# Patient Record
Sex: MALE | URBAN - METROPOLITAN AREA
[De-identification: names, ages, dates, MRNs, and addresses within clinical notes are randomized per-mention and may not be internally consistent; named-entity substitution may affect disease eponyms.]

---

## 2023-11-22 ENCOUNTER — TELEMEDICINE (OUTPATIENT)
Age: 78
End: 2023-11-22
Payer: MEDICARE

## 2023-11-22 DIAGNOSIS — T42.8X5A MOTOR FLUCTUATIONS RELATED TO MEDICATION USE IN PARKINSON'S DISEASE: ICD-10-CM

## 2023-11-22 DIAGNOSIS — R63.4 WEIGHT LOSS: ICD-10-CM

## 2023-11-22 DIAGNOSIS — G20 MOTOR FLUCTUATIONS RELATED TO MEDICATION USE IN PARKINSON'S DISEASE: ICD-10-CM

## 2023-11-22 DIAGNOSIS — G20.B2 PARKINSON'S DISEASE WITH DYSKINESIA AND FLUCTUATING MANIFESTATIONS: Primary | ICD-10-CM

## 2023-11-22 DIAGNOSIS — G31.84 MCI (MILD COGNITIVE IMPAIRMENT): ICD-10-CM

## 2023-11-22 DIAGNOSIS — R13.14 PHARYNGOESOPHAGEAL DYSPHAGIA: ICD-10-CM

## 2023-11-22 PROCEDURE — 1123F ACP DISCUSS/DSCN MKR DOCD: CPT | Performed by: PSYCHIATRY & NEUROLOGY

## 2023-11-22 PROCEDURE — 99214 OFFICE O/P EST MOD 30 MIN: CPT | Performed by: PSYCHIATRY & NEUROLOGY

## 2023-11-22 RX ORDER — APIXABAN 5 MG/1
5 TABLET, FILM COATED ORAL 2 TIMES DAILY
COMMUNITY
Start: 2023-11-02

## 2023-11-22 RX ORDER — AMANTADINE HYDROCHLORIDE 100 MG/1
CAPSULE, GELATIN COATED ORAL DAILY
COMMUNITY
Start: 2023-09-09 | End: 2023-11-22 | Stop reason: SDUPTHER

## 2023-11-22 RX ORDER — CARBIDOPA/LEVODOPA 25MG-250MG
1 TABLET ORAL 3 TIMES DAILY
COMMUNITY
Start: 2023-11-15 | End: 2023-11-22 | Stop reason: SDUPTHER

## 2023-11-22 RX ORDER — AMANTADINE HYDROCHLORIDE 100 MG/1
100 CAPSULE, GELATIN COATED ORAL DAILY
Qty: 30 CAPSULE | Refills: 5 | Status: SHIPPED | OUTPATIENT
Start: 2023-11-22

## 2023-11-22 RX ORDER — SOTALOL HYDROCHLORIDE 80 MG/1
80 TABLET ORAL 2 TIMES DAILY
COMMUNITY
Start: 2023-11-18

## 2023-11-22 RX ORDER — PANTOPRAZOLE SODIUM 20 MG/1
20 TABLET, DELAYED RELEASE ORAL DAILY
COMMUNITY
Start: 2023-11-12

## 2023-11-22 RX ORDER — ATORVASTATIN CALCIUM 10 MG/1
10 TABLET, FILM COATED ORAL DAILY
COMMUNITY
Start: 2023-10-21

## 2023-11-22 RX ORDER — CARBIDOPA AND LEVODOPA 50; 200 MG/1; MG/1
1 TABLET, EXTENDED RELEASE ORAL NIGHTLY
Qty: 30 TABLET | Refills: 5 | Status: SHIPPED | OUTPATIENT
Start: 2023-11-22

## 2023-11-22 RX ORDER — CARBIDOPA/LEVODOPA 25MG-250MG
1.5 TABLET ORAL 3 TIMES DAILY
Qty: 105 TABLET | Refills: 5 | Status: SHIPPED | OUTPATIENT
Start: 2023-11-22

## 2023-11-22 RX ORDER — MIRTAZAPINE 15 MG/1
15 TABLET, FILM COATED ORAL NIGHTLY
COMMUNITY
Start: 2023-09-16 | End: 2023-11-22 | Stop reason: SDUPTHER

## 2023-11-22 RX ORDER — MIRTAZAPINE 15 MG/1
15 TABLET, FILM COATED ORAL NIGHTLY
Qty: 30 TABLET | Refills: 5 | Status: SHIPPED | OUTPATIENT
Start: 2023-11-22

## 2023-11-22 RX ORDER — TAMSULOSIN HYDROCHLORIDE 0.4 MG/1
CAPSULE ORAL
COMMUNITY
Start: 2023-10-02

## 2023-11-22 NOTE — PROGRESS NOTES
Anselmo Heller MD       Sarah Leon is a 78 y.o. male presenting as a follow patient for a   Chief Complaint   Patient presents with    parkinsons      Date of Diagnosis: 2015  Since diagnosis in [place]. Onset of symtoms : since 2015    Progression:   2 falls in between. No LOC. On standing does gets lightheaded. Has more dyskinesias- in head/face/hands   On amantadine - resolved dyskinesias       on sinemet 25/250 mg tid- 8am, 12 noon and 4 pm  Takes sinemet cr 25/100 mg at 8pm     Having no weight loss  Has been choking on solids  No trouble with liquids  Taking longer to finish meals       Primary motor symptoms: Resting tremor, Bradykinesia (slow movement), Rigidity and Postural Instability (Impaired Balance and Coordination)  Secondary motor symptoms: Stooped posture, a tendency to lean forward, Fatigue, Impaired fine motor dexterity and motor coordination , Impaired gross motor coordination , Speech problems, such as softness of voice or slurred speech caused by lack of muscle control and Loss of facial expression, or \"masking\"   Non motor symptoms: Dementia or confusion and Depression      Now:   Dyskinesias: in head. Resolved     Motor fluctuations: wears off an hour before is due for medication  Tremors worsen  But not severe     Tremors: yes, right handed  Has resting tremors in right arm. None in left arm/legs. Worsens without sinemet       Stiffness: no longer feels tight on waking up   Gait/walking difficulties: better  Cane, walker, wheelchair use: no  Falls: 2 in last 4-5 months   Mood/Behavior changes: yes, had a manic phase in summer 2021  Hallucinations: no  Sleep: no sleep concerns and feels rested on waking up. Denies dreams. But rare dreams are vivid   Slowness in daily activities: yes   Slurred Speech:slurring more   Drooling of saliva: yes, during day time  Swallowing difficulty: periodic choking while swallowing   Pain: rarely back pain     Memory loss:   Independent

## 2023-12-04 ENCOUNTER — TELEPHONE (OUTPATIENT)
Age: 78
End: 2023-12-04

## 2023-12-04 NOTE — TELEPHONE ENCOUNTER
Pt. Called in stating Pt. Was having episode of dyskinesia, last visit Sinemet was changed to 1 1/2 tabs, pt wife and realized she was giving amantadine in AM instead of PM so she will change dosage of Sinemet back to 1 tab to see if there is a difference.

## 2024-02-20 ENCOUNTER — TELEPHONE (OUTPATIENT)
Age: 79
End: 2024-02-20

## 2024-02-20 NOTE — TELEPHONE ENCOUNTER
----- Message from Teodora Rolle MA sent at 2/20/2024  1:55 PM EST -----  Pt called stating his cardiologist Dr Ferris wanted a brain scan in regards to pt high blood pressure, cardiologist thinks it is more related to neurological problems than cardiology issues. Pt also said that the doctor stated he was going to call you last week to discuss it.

## 2024-02-20 NOTE — TELEPHONE ENCOUNTER
He had a mri brain in 12/2022    He has a pacemaker, if we have to do a repeat scan like mri, does patient know if pacemaker is mri compatible?      I did not get a call yet from cardiologist.    If possible, if they can call me or place a message on epic as a telephone encounter- I can see if there is any specific concern      Philly Covington MD

## 2024-02-23 NOTE — TELEPHONE ENCOUNTER
Find out from Dr. Mobley- what he wanted to discuss with me about  Please pass on my cell number if needed      Philly Covington MD

## 2024-03-18 ENCOUNTER — OFFICE VISIT (OUTPATIENT)
Age: 79
End: 2024-03-18
Payer: MEDICARE

## 2024-03-18 VITALS — SYSTOLIC BLOOD PRESSURE: 122 MMHG | WEIGHT: 181.5 LBS | DIASTOLIC BLOOD PRESSURE: 69 MMHG | HEART RATE: 91 BPM

## 2024-03-18 DIAGNOSIS — G20.B2 PARKINSON'S DISEASE WITH DYSKINESIA AND FLUCTUATING MANIFESTATIONS: Primary | ICD-10-CM

## 2024-03-18 DIAGNOSIS — G20.A2 MOTOR FLUCTUATIONS RELATED TO MEDICATION USE IN PARKINSON'S DISEASE: ICD-10-CM

## 2024-03-18 DIAGNOSIS — T42.8X5A MOTOR FLUCTUATIONS RELATED TO MEDICATION USE IN PARKINSON'S DISEASE: ICD-10-CM

## 2024-03-18 DIAGNOSIS — I95.1 ORTHOSTATIC HYPOTENSION: ICD-10-CM

## 2024-03-18 DIAGNOSIS — G31.84 MCI (MILD COGNITIVE IMPAIRMENT): ICD-10-CM

## 2024-03-18 DIAGNOSIS — R63.4 WEIGHT LOSS: ICD-10-CM

## 2024-03-18 PROCEDURE — 1123F ACP DISCUSS/DSCN MKR DOCD: CPT | Performed by: PSYCHIATRY & NEUROLOGY

## 2024-03-18 PROCEDURE — 99214 OFFICE O/P EST MOD 30 MIN: CPT | Performed by: PSYCHIATRY & NEUROLOGY

## 2024-03-18 RX ORDER — MULTIVIT-MIN/FERROUS GLUCONATE 9 MG/15 ML
15 LIQUID (ML) ORAL DAILY
COMMUNITY

## 2024-03-18 RX ORDER — DIPHENOXYLATE HYDROCHLORIDE AND ATROPINE SULFATE 2.5; .025 MG/1; MG/1
TABLET ORAL
COMMUNITY
Start: 2024-01-11

## 2024-03-18 RX ORDER — MIRTAZAPINE 15 MG/1
15 TABLET, FILM COATED ORAL NIGHTLY
Qty: 30 TABLET | Refills: 5 | Status: SHIPPED | OUTPATIENT
Start: 2024-03-18

## 2024-03-18 RX ORDER — VITAMIN B COMPLEX
1 CAPSULE ORAL DAILY
COMMUNITY

## 2024-03-18 RX ORDER — ASPIRIN 81 MG/1
81 TABLET, COATED ORAL DAILY
COMMUNITY

## 2024-03-18 RX ORDER — AMANTADINE HYDROCHLORIDE 100 MG/1
100 CAPSULE, GELATIN COATED ORAL DAILY
Qty: 30 CAPSULE | Refills: 5 | Status: SHIPPED | OUTPATIENT
Start: 2024-03-18

## 2024-03-18 RX ORDER — RIVASTIGMINE TARTRATE 3 MG/1
3 CAPSULE ORAL 2 TIMES DAILY
Qty: 60 CAPSULE | Refills: 3 | Status: SHIPPED | OUTPATIENT
Start: 2024-03-18

## 2024-03-18 RX ORDER — FLUOCINOLONE ACETONIDE 0.11 MG/ML
OIL TOPICAL
COMMUNITY
Start: 2024-01-16

## 2024-03-18 ASSESSMENT — MINI MENTAL STATE EXAM
WHAT COUNTRY ARE WE IN?: 1
SAY: I AM GOING TO NAME THREE OBJECTS. WHEN I AM FINISHED, I WANT YOU TO REPEAT
THEM. REMEMBER WHAT THEY ARE BECAUSE I AM GOING TO ASK YOU TO NAME THEM AGAIN IN
A FEW MINUTES.  SAY THE FOLLOWING WORDS SLOWLY AT 1-SECOND INTERVALS - BALL/ CAR/ MAN [ITERATIONS FOR REPEAT ADMINISTRATION]: 3
SAY: I WOULD LIKE YOU TO COUNT BACKWARD FROM 100 BY SEVENS: 0
WHAT STATE [OR PROVINCE] ARE WE IN?: 1
WHAT CITY/TOWN ARE WE IN?: 1
SUM ALL MMSE QUESTIONS FOR TOTAL SCORE [OUT OF 30].: 20
NOW WHAT WERE THE THREE OBJECTS I ASKED YOU TO REMEMBER?: 1
WHAT MONTH IS THIS?: 1
WHAT YEAR IS THIS?: 1
SAY: FOLD THE PAPER IN HALF ONCE WITH BOTH HANDS, SCORE IF PAPER IS CORRECTLY FOLDED IN HALF.: 1
WHAT FLOOR ARE WE ON [IN FACILITY]?/ WHAT ROOM ARE WE IN [IN HOME]?: 1
WHAT IS TODAY'S DATE?: 0
ASK THE PERSON IF HE IS RIGHT OR LEFT-HANDED. TAKE A PIECE OF PAPER AND HOLD IT UP IN
FRONT OF THE PERSON. SAY: TAKE THIS PAPER IN YOUR RIGHT/LEFT HAND (WHICHEVER IS NON-
DOMINANT), SCORE IF PAPER IS PICKED UP IN CORRECT HAND.: 1
PLACE DESIGN, ERASER AND PENCIL IN FRONT OF THE PERSON.  SAY:  COPY THIS DESIGN PLEASE.  SHOW: DESIGN. ALLOW: MULTIPLE TRIES. WAIT UNTIL PERSON IS FINISHED AND HANDS IT BACK. SCORE: ONLY FOR DIAGRAM WITH 4-SIDED FIGURE BETWEEN TWO 5-SIDED FIGURES: 0
WHICH SEASON IS THIS?: 1
SAY: READ THE WORDS ON THE PAGE AND THEN DO WHAT IT SAYS. THEN HAND THE PERSON
THE SHEET WITH CLOSE YOUR EYES ON IT. IF THE SUBJECT READS AND DOES NOT CLOSE THEIR EYES, REPEAT UP TO THREE TIMES. SCORE ONLY IF SUBJECT CLOSES EYES.: 1
SAY: I WOULD LIKE YOU TO REPEAT THIS PHRASE AFTER ME: NO IFS, ANDS, OR BUTS.: 1
SHOW: WRISTWATCH [OBJECT] ASK: WHAT IS THIS CALLED?: 1
SHOW: PENCIL [OBJECT] ASK: WHAT IS THIS CALLED?: 1
WHAT IS THE NAME OF THIS BUILDING [IN FACILITY]?/WHAT IS THE STREET ADDRESS OF THIS HOUSE [IN HOME]?: 0
WHAT DAY OF THE WEEK IS THIS?: 1
SAY: PUT THE PAPER DOWN ON THE FLOOR, SCORE IF PAPER IS PLACED BACK ON FLOOR: 1
HAND THE PERSON A PENCIL AND PAPER. SAY: WRITE ANY COMPLETE SENTENCE ON THAT
PIECE OF PAPER. (NOTE: THE SENTENCE MUST MAKE SENSE. IGNORE SPELLING ERRORS): 1

## 2024-03-18 NOTE — PROGRESS NOTES
Philly Covington MD       Holland Leong is a 78 y.o. male presenting as a follow patient for a   Chief Complaint   Patient presents with    Follow-up     6 month follow up for parkinsons        Having ER visits with atrial fibrillation  Seeing Dr. Carmona.      Date of Diagnosis: 2015  Since diagnosis in [place].     Onset of symtoms : since 2015    Progression:   No falls   Having orthostatic hypotension, so wearing compression stockings  On amantadine - resolved dyskinesias . Minimal in evenings      on sinemet 25/250 mg  1 pill tid- 8am, 12 noon and 4 pm  Takes sinemet cr 25/100 mg at 8pm      Primary motor symptoms: Resting tremor, Bradykinesia (slow movement), Rigidity and Postural Instability (Impaired Balance and Coordination)  Secondary motor symptoms: Stooped posture, a tendency to lean forward, Fatigue, Impaired fine motor dexterity and motor coordination , Impaired gross motor coordination , Speech problems, such as softness of voice or slurred speech caused by lack of muscle control and Loss of facial expression, or \"masking\"   Non motor symptoms: Dementia or confusion and Depression      Now:   Dyskinesias: in head. Resolved   Better on amantadine     Motor fluctuations: wears off an hour before is due for medication  Tremors worsen      Tremors: yes, right handed  Has resting tremors in right arm.   None in left arm/legs.   Able to bowl 3 games on friday      Stiffness: no longer feels tight on waking up   Gait/walking difficulties: better  Cane, walker, wheelchair use: no  Falls: none  Mood/Behavior changes: yes, had a manic phase in summer 2021  Hallucinations: medication induced, from reglan  Sleep: vivid dreams.   Denies dreams.  But rare dreams are vivid   Slowness in daily activities: yes   Slurred Speech:slurring more   Drooling of saliva: yes, during day time  Swallowing difficulty: some penetration with thin liquids. Corrected with chin tucks. No thickeners.   Pain: rarely back pain

## 2024-03-22 ENCOUNTER — TELEPHONE (OUTPATIENT)
Age: 79
End: 2024-03-22

## 2024-03-22 RX ORDER — RIVASTIGMINE 4.6 MG/24H
1 PATCH, EXTENDED RELEASE TRANSDERMAL DAILY
Qty: 30 PATCH | Refills: 3 | Status: SHIPPED | OUTPATIENT
Start: 2024-03-22

## 2024-03-22 NOTE — TELEPHONE ENCOUNTER
Try exelon patch      The following medication has been approved and sent to your pharmacy    Requested Prescriptions     Signed Prescriptions Disp Refills    rivastigmine (EXELON) 4.6 MG/24HR 30 patch 3     Sig: Place 1 patch onto the skin daily     Authorizing Provider: LUCA ZALDIVAR

## 2024-03-22 NOTE — TELEPHONE ENCOUNTER
----- Message from Teodora Rolle MA sent at 3/21/2024  4:43 PM EDT -----  Regarding: exelon  Pt had reaction to Exelon, vomiting, wife wondered if there was another med he could try

## 2024-05-28 ENCOUNTER — TELEPHONE (OUTPATIENT)
Age: 79
End: 2024-05-28

## 2024-05-28 NOTE — TELEPHONE ENCOUNTER
Had a fall last week. On getting out of car. On trying to reach for door - fell .  Blames it on depth perception is affected   Starting vision issues- depth perception is affected    Tremors is worse- between doses of medications    Cognition- at times worse    Bp sitting - 120's-     Having diarrhea.     -  compression stockings, increase fluid intake     Off eliquis. Has pacemaker and watchman device         Keep July appt   Philly Covington MD

## 2024-05-28 NOTE — TELEPHONE ENCOUNTER
Pt wife called in during the weekend and states pt has been having changes in vision and is blurry and had a fall

## 2024-07-11 PROCEDURE — 88312 SPECIAL STAINS GROUP 1: CPT | Performed by: DERMATOLOGY

## 2024-07-11 PROCEDURE — 88305 TISSUE EXAM BY PATHOLOGIST: CPT | Performed by: DERMATOLOGY

## 2024-07-15 ENCOUNTER — LAB REQUISITION (OUTPATIENT)
Dept: DERMATOPATHOLOGY | Facility: CLINIC | Age: 79
End: 2024-07-15
Payer: COMMERCIAL

## 2024-07-15 DIAGNOSIS — R21 RASH AND OTHER NONSPECIFIC SKIN ERUPTION: ICD-10-CM

## 2024-07-16 LAB
LABORATORY COMMENT REPORT: NORMAL
PATH REPORT.FINAL DX SPEC: NORMAL
PATH REPORT.GROSS SPEC: NORMAL
PATH REPORT.RELEVANT HX SPEC: NORMAL
PATH REPORT.TOTAL CANCER: NORMAL

## 2024-07-18 ENCOUNTER — OFFICE VISIT (OUTPATIENT)
Age: 79
End: 2024-07-18
Payer: MEDICARE

## 2024-07-18 VITALS
HEART RATE: 90 BPM | DIASTOLIC BLOOD PRESSURE: 76 MMHG | WEIGHT: 173.8 LBS | TEMPERATURE: 97.3 F | SYSTOLIC BLOOD PRESSURE: 152 MMHG

## 2024-07-18 DIAGNOSIS — I95.1 ORTHOSTATIC HYPOTENSION: ICD-10-CM

## 2024-07-18 DIAGNOSIS — G20.A2 MOTOR FLUCTUATIONS RELATED TO MEDICATION USE IN PARKINSON'S DISEASE (HCC): ICD-10-CM

## 2024-07-18 DIAGNOSIS — G20.B2 PARKINSON'S DISEASE WITH DYSKINESIA AND FLUCTUATING MANIFESTATIONS (HCC): Primary | ICD-10-CM

## 2024-07-18 DIAGNOSIS — T42.8X5A MOTOR FLUCTUATIONS RELATED TO MEDICATION USE IN PARKINSON'S DISEASE (HCC): ICD-10-CM

## 2024-07-18 DIAGNOSIS — G31.84 MCI (MILD COGNITIVE IMPAIRMENT): ICD-10-CM

## 2024-07-18 PROCEDURE — 99214 OFFICE O/P EST MOD 30 MIN: CPT | Performed by: PSYCHIATRY & NEUROLOGY

## 2024-07-18 PROCEDURE — 1123F ACP DISCUSS/DSCN MKR DOCD: CPT | Performed by: PSYCHIATRY & NEUROLOGY

## 2024-07-18 RX ORDER — CARBIDOPA AND LEVODOPA 50; 200 MG/1; MG/1
1 TABLET, EXTENDED RELEASE ORAL NIGHTLY
Qty: 30 TABLET | Refills: 5 | Status: SHIPPED | OUTPATIENT
Start: 2024-07-18

## 2024-07-18 RX ORDER — AMANTADINE HYDROCHLORIDE 100 MG/1
100 CAPSULE, GELATIN COATED ORAL DAILY
Qty: 30 CAPSULE | Refills: 5 | Status: SHIPPED | OUTPATIENT
Start: 2024-07-18

## 2024-07-18 RX ORDER — ENTACAPONE 200 MG/1
200 TABLET ORAL 3 TIMES DAILY
Qty: 90 TABLET | Refills: 3 | Status: SHIPPED | OUTPATIENT
Start: 2024-07-18

## 2024-07-18 RX ORDER — RIVASTIGMINE TARTRATE 3 MG/1
3 CAPSULE ORAL 2 TIMES DAILY
Qty: 60 CAPSULE | Refills: 3 | Status: CANCELLED | OUTPATIENT
Start: 2024-07-18

## 2024-07-18 RX ORDER — MIRTAZAPINE 15 MG/1
15 TABLET, FILM COATED ORAL NIGHTLY
Qty: 30 TABLET | Refills: 5 | Status: SHIPPED | OUTPATIENT
Start: 2024-07-18

## 2024-07-18 RX ORDER — CARBIDOPA/LEVODOPA 25MG-250MG
1 TABLET ORAL 3 TIMES DAILY
Qty: 90 TABLET | Refills: 3 | Status: SHIPPED | OUTPATIENT
Start: 2024-07-18

## 2024-07-18 RX ORDER — RIVASTIGMINE 9.5 MG/24H
1 PATCH, EXTENDED RELEASE TRANSDERMAL DAILY
Qty: 30 PATCH | Refills: 5 | Status: SHIPPED | OUTPATIENT
Start: 2024-07-18

## 2024-07-18 RX ORDER — RIVASTIGMINE 4.6 MG/24H
1 PATCH, EXTENDED RELEASE TRANSDERMAL DAILY
Qty: 30 PATCH | Refills: 3 | Status: CANCELLED | OUTPATIENT
Start: 2024-07-18

## 2024-07-18 NOTE — PROGRESS NOTES
3/24   In 6/23: 25    ASSESSMENT AND PLAN   1. Parkinson's disease with dyskinesia and fluctuating manifestations (HCC)  Continue sinemet 25/250 mg tid  Add comtan 200 mg tid  Continue sinemet cr 25/100 mg qhs  Using amantadine 100 mg daily for dyskinesias    2. Motor fluctuations related to medication use in Parkinson's disease (HCC)  Has wearing off effects on sinemet 30 minutes before next dose  So add comtan tid    3. MCI (mild cognitive impairment)  Mci slightly better  On exelon patch increase to 9.5 mg daily  Could not tolerate exelon pills  On remeron 15 mg qhs    4. Orthostatic hypotension  Advised compression stockings.   With any PD med change         Philly Covington MD

## 2024-08-12 RX ORDER — CARBIDOPA/LEVODOPA 25MG-250MG
1 TABLET ORAL 3 TIMES DAILY
Qty: 90 TABLET | Refills: 3 | Status: SHIPPED | OUTPATIENT
Start: 2024-08-12

## 2024-09-09 ENCOUNTER — TELEPHONE (OUTPATIENT)
Age: 79
End: 2024-09-09

## 2024-09-09 RX ORDER — FLUDROCORTISONE ACETATE 0.1 MG/1
0.1 TABLET ORAL DAILY
Qty: 30 TABLET | Refills: 1 | Status: SHIPPED | OUTPATIENT
Start: 2024-09-09 | End: 2024-11-08

## 2024-09-19 ENCOUNTER — OFFICE VISIT (OUTPATIENT)
Age: 79
End: 2024-09-19
Payer: MEDICARE

## 2024-09-19 VITALS
HEIGHT: 73 IN | SYSTOLIC BLOOD PRESSURE: 132 MMHG | BODY MASS INDEX: 22.4 KG/M2 | WEIGHT: 169 LBS | HEART RATE: 89 BPM | DIASTOLIC BLOOD PRESSURE: 74 MMHG

## 2024-09-19 DIAGNOSIS — G20.B2 PARKINSON'S DISEASE WITH DYSKINESIA AND FLUCTUATING MANIFESTATIONS (HCC): Primary | ICD-10-CM

## 2024-09-19 DIAGNOSIS — G20.A2 MOTOR FLUCTUATIONS RELATED TO MEDICATION USE IN PARKINSON'S DISEASE (HCC): ICD-10-CM

## 2024-09-19 DIAGNOSIS — G31.84 MCI (MILD COGNITIVE IMPAIRMENT): ICD-10-CM

## 2024-09-19 DIAGNOSIS — T42.8X5A MOTOR FLUCTUATIONS RELATED TO MEDICATION USE IN PARKINSON'S DISEASE (HCC): ICD-10-CM

## 2024-09-19 DIAGNOSIS — I95.1 ORTHOSTATIC HYPOTENSION: ICD-10-CM

## 2024-09-19 DIAGNOSIS — R11.0 NAUSEA: ICD-10-CM

## 2024-09-19 PROCEDURE — 1123F ACP DISCUSS/DSCN MKR DOCD: CPT | Performed by: PSYCHIATRY & NEUROLOGY

## 2024-09-19 PROCEDURE — 99214 OFFICE O/P EST MOD 30 MIN: CPT | Performed by: PSYCHIATRY & NEUROLOGY

## 2024-09-19 RX ORDER — CARBIDOPA/LEVODOPA 25MG-250MG
TABLET ORAL
Qty: 135 TABLET | Refills: 5 | Status: SHIPPED | OUTPATIENT
Start: 2024-09-19

## 2024-09-19 RX ORDER — AMANTADINE HYDROCHLORIDE 100 MG/1
100 CAPSULE, GELATIN COATED ORAL DAILY
Qty: 30 CAPSULE | Refills: 5 | Status: SHIPPED | OUTPATIENT
Start: 2024-09-19

## 2024-09-19 RX ORDER — POLYETHYLENE GLYCOL 3350 17 G/17G
17 POWDER, FOR SOLUTION ORAL DAILY PRN
COMMUNITY

## 2024-09-19 RX ORDER — ONDANSETRON 4 MG/1
4 TABLET, FILM COATED ORAL 2 TIMES DAILY
Qty: 60 TABLET | Refills: 5 | Status: SHIPPED | OUTPATIENT
Start: 2024-09-19

## 2024-09-19 RX ORDER — FLUDROCORTISONE ACETATE 0.1 MG/1
0.2 TABLET ORAL DAILY
Qty: 60 TABLET | Refills: 5 | Status: SHIPPED | OUTPATIENT
Start: 2024-09-19 | End: 2025-03-18

## 2024-09-19 RX ORDER — DOCUSATE SODIUM 100 MG/1
100 CAPSULE, LIQUID FILLED ORAL 2 TIMES DAILY
COMMUNITY

## 2024-09-19 RX ORDER — CARBIDOPA AND LEVODOPA 50; 200 MG/1; MG/1
1 TABLET, EXTENDED RELEASE ORAL NIGHTLY
Qty: 30 TABLET | Refills: 5 | Status: SHIPPED | OUTPATIENT
Start: 2024-09-19

## 2024-09-19 RX ORDER — MIRTAZAPINE 15 MG/1
15 TABLET, FILM COATED ORAL NIGHTLY
Qty: 30 TABLET | Refills: 5 | Status: SHIPPED | OUTPATIENT
Start: 2024-09-19

## 2024-11-18 ENCOUNTER — OFFICE VISIT (OUTPATIENT)
Age: 79
End: 2024-11-18
Payer: MEDICARE

## 2024-11-18 VITALS
WEIGHT: 168.4 LBS | SYSTOLIC BLOOD PRESSURE: 120 MMHG | DIASTOLIC BLOOD PRESSURE: 78 MMHG | HEIGHT: 73 IN | BODY MASS INDEX: 22.32 KG/M2

## 2024-11-18 DIAGNOSIS — I95.1 ORTHOSTATIC HYPOTENSION: ICD-10-CM

## 2024-11-18 DIAGNOSIS — G20.A2 MOTOR FLUCTUATIONS RELATED TO MEDICATION USE IN PARKINSON'S DISEASE (HCC): ICD-10-CM

## 2024-11-18 DIAGNOSIS — T42.8X5A MOTOR FLUCTUATIONS RELATED TO MEDICATION USE IN PARKINSON'S DISEASE (HCC): ICD-10-CM

## 2024-11-18 DIAGNOSIS — R11.0 NAUSEA: ICD-10-CM

## 2024-11-18 DIAGNOSIS — G20.B2 PARKINSON'S DISEASE WITH DYSKINESIA AND FLUCTUATING MANIFESTATIONS (HCC): Primary | ICD-10-CM

## 2024-11-18 DIAGNOSIS — G31.84 MCI (MILD COGNITIVE IMPAIRMENT): ICD-10-CM

## 2024-11-18 PROCEDURE — 1159F MED LIST DOCD IN RCRD: CPT | Performed by: PSYCHIATRY & NEUROLOGY

## 2024-11-18 PROCEDURE — 1123F ACP DISCUSS/DSCN MKR DOCD: CPT | Performed by: PSYCHIATRY & NEUROLOGY

## 2024-11-18 PROCEDURE — 99214 OFFICE O/P EST MOD 30 MIN: CPT | Performed by: PSYCHIATRY & NEUROLOGY

## 2024-11-18 RX ORDER — LEVODOPA AND CARBIDOPA 145; 36.25 MG/1; MG/1
3 CAPSULE, EXTENDED RELEASE ORAL 3 TIMES DAILY
Qty: 270 CAPSULE | Refills: 5 | Status: SHIPPED | OUTPATIENT
Start: 2024-11-18

## 2024-11-18 RX ORDER — MEMANTINE HYDROCHLORIDE 5 MG/1
5 TABLET ORAL 2 TIMES DAILY
Qty: 60 TABLET | Refills: 5 | Status: SHIPPED | OUTPATIENT
Start: 2024-11-18

## 2024-11-18 NOTE — PROGRESS NOTES
Philly Covington MD       Holland Leong is a 79 y.o. male presenting as a follow patient for a   Chief Complaint   Patient presents with    Follow-up    PARKINSONS      Having ER visits with atrial fibrillation  Seeing Dr. Carmona.   had watchman placed on jan 2024  Off eliquis             Parkinsons disease   Date of Diagnosis: 2015  Since diagnosis in [place].     Onset of symtoms : since 2015    Progression: since dose increase:   Orthostatic hypotension better with d/c of metoprolol  -wearing compression stockings  -on florinef   -not taking droxidopa       2. Nausea gone   D/bill of exelon     3. Increased dyskinesias                Present medication:   on sinemet 25/250 mg  1.5 pill tid- 8am, 12 noon and 4 pm. Wears off 30 minutes before next dose   Not taking comtan - made him extremely weak     Takes sinemet cr 50/200 mg at 8pm    On amantadine - dyskinesias still prominent   Primary motor symptoms: Resting tremor, Bradykinesia (slow movement), Rigidity and Postural Instability (Impaired Balance and Coordination)  Secondary motor symptoms: Stooped posture, a tendency to lean forward, Fatigue, Impaired fine motor dexterity and motor coordination , Impaired gross motor coordination , Speech problems, such as softness of voice or slurred speech caused by lack of muscle control and Loss of facial expression, or \"masking\"   Non motor symptoms: Dementia or confusion and Depression      Now:   Dyskinesias: starting again  Some tongue movements     Motor fluctuations: wears off an hour before is due for medication  Tremors worsen      Tremors: yes, right handed  Has resting tremors in right arm.   None in left arm/legs.   Able to bowl 3 games on friday      Stiffness: no longer feels tight on waking up   Gait/walking difficulties: better  Cane, walker, wheelchair use: no  Falls: 2 falls   Mood/Behavior changes: yes, had a manic phase in summer 2021  Hallucinations: medication induced, from reglan  None

## 2024-11-18 NOTE — PATIENT INSTRUCTIONS
D/c sinemet 25/250 mg tablets, 1.5 tablets three times a day    Hold sinemet cr 50/200 mg at night      Hold amantadine      Only use rytary 3 pillls three times a day      If needed, add amantadine      Stay off exelon  Add namen  da 5 mg twice a day    Use stockings, florinef 0.1 mg daily  Hold off droxydopa.   Philly Covington MD

## 2024-12-01 ENCOUNTER — APPOINTMENT (OUTPATIENT)
Dept: RADIOLOGY | Facility: HOSPITAL | Age: 79
End: 2024-12-01
Payer: MEDICARE

## 2024-12-01 ENCOUNTER — HOSPITAL ENCOUNTER (EMERGENCY)
Facility: HOSPITAL | Age: 79
Discharge: HOME | End: 2024-12-01
Attending: EMERGENCY MEDICINE
Payer: MEDICARE

## 2024-12-01 ENCOUNTER — APPOINTMENT (OUTPATIENT)
Dept: CARDIOLOGY | Facility: HOSPITAL | Age: 79
End: 2024-12-01
Payer: MEDICARE

## 2024-12-01 VITALS
WEIGHT: 169.75 LBS | OXYGEN SATURATION: 97 % | RESPIRATION RATE: 20 BRPM | BODY MASS INDEX: 21.79 KG/M2 | DIASTOLIC BLOOD PRESSURE: 78 MMHG | TEMPERATURE: 97.7 F | HEART RATE: 70 BPM | SYSTOLIC BLOOD PRESSURE: 152 MMHG | HEIGHT: 74 IN

## 2024-12-01 DIAGNOSIS — R42 DIZZINESS: ICD-10-CM

## 2024-12-01 DIAGNOSIS — I95.1 ORTHOSTATIC HYPOTENSION: Primary | ICD-10-CM

## 2024-12-01 LAB
ALBUMIN SERPL BCP-MCNC: 4.2 G/DL (ref 3.4–5)
ALP SERPL-CCNC: 71 U/L (ref 33–136)
ALT SERPL W P-5'-P-CCNC: 5 U/L (ref 10–52)
ANION GAP SERPL CALC-SCNC: 11 MMOL/L (ref 10–20)
AST SERPL W P-5'-P-CCNC: 22 U/L (ref 9–39)
BASOPHILS # BLD AUTO: 0.04 X10*3/UL (ref 0–0.1)
BASOPHILS NFR BLD AUTO: 0.6 %
BILIRUB SERPL-MCNC: 0.7 MG/DL (ref 0–1.2)
BNP SERPL-MCNC: 69 PG/ML (ref 0–99)
BUN SERPL-MCNC: 17 MG/DL (ref 6–23)
CALCIUM SERPL-MCNC: 9.3 MG/DL (ref 8.6–10.3)
CARDIAC TROPONIN I PNL SERPL HS: 7 NG/L (ref 0–20)
CHLORIDE SERPL-SCNC: 103 MMOL/L (ref 98–107)
CO2 SERPL-SCNC: 29 MMOL/L (ref 21–32)
CREAT SERPL-MCNC: 0.92 MG/DL (ref 0.5–1.3)
EGFRCR SERPLBLD CKD-EPI 2021: 84 ML/MIN/1.73M*2
EOSINOPHIL # BLD AUTO: 0.09 X10*3/UL (ref 0–0.4)
EOSINOPHIL NFR BLD AUTO: 1.2 %
ERYTHROCYTE [DISTWIDTH] IN BLOOD BY AUTOMATED COUNT: 13.3 % (ref 11.5–14.5)
FLUAV RNA RESP QL NAA+PROBE: NOT DETECTED
FLUBV RNA RESP QL NAA+PROBE: NOT DETECTED
GLUCOSE SERPL-MCNC: 95 MG/DL (ref 74–99)
HCT VFR BLD AUTO: 40.8 % (ref 41–52)
HGB BLD-MCNC: 13.6 G/DL (ref 13.5–17.5)
IMM GRANULOCYTES # BLD AUTO: 0.02 X10*3/UL (ref 0–0.5)
IMM GRANULOCYTES NFR BLD AUTO: 0.3 % (ref 0–0.9)
LYMPHOCYTES # BLD AUTO: 1.31 X10*3/UL (ref 0.8–3)
LYMPHOCYTES NFR BLD AUTO: 18.1 %
MCH RBC QN AUTO: 32.8 PG (ref 26–34)
MCHC RBC AUTO-ENTMCNC: 33.3 G/DL (ref 32–36)
MCV RBC AUTO: 98 FL (ref 80–100)
MONOCYTES # BLD AUTO: 0.45 X10*3/UL (ref 0.05–0.8)
MONOCYTES NFR BLD AUTO: 6.2 %
NEUTROPHILS # BLD AUTO: 5.32 X10*3/UL (ref 1.6–5.5)
NEUTROPHILS NFR BLD AUTO: 73.6 %
NRBC BLD-RTO: 0 /100 WBCS (ref 0–0)
PLATELET # BLD AUTO: 304 X10*3/UL (ref 150–450)
POTASSIUM SERPL-SCNC: 3.8 MMOL/L (ref 3.5–5.3)
PROT SERPL-MCNC: 6.8 G/DL (ref 6.4–8.2)
RBC # BLD AUTO: 4.15 X10*6/UL (ref 4.5–5.9)
SARS-COV-2 RNA RESP QL NAA+PROBE: NOT DETECTED
SODIUM SERPL-SCNC: 139 MMOL/L (ref 136–145)
WBC # BLD AUTO: 7.2 X10*3/UL (ref 4.4–11.3)

## 2024-12-01 PROCEDURE — 71045 X-RAY EXAM CHEST 1 VIEW: CPT

## 2024-12-01 PROCEDURE — 84484 ASSAY OF TROPONIN QUANT: CPT

## 2024-12-01 PROCEDURE — 93005 ELECTROCARDIOGRAM TRACING: CPT

## 2024-12-01 PROCEDURE — 70450 CT HEAD/BRAIN W/O DYE: CPT

## 2024-12-01 PROCEDURE — 85025 COMPLETE CBC W/AUTO DIFF WBC: CPT

## 2024-12-01 PROCEDURE — 84075 ASSAY ALKALINE PHOSPHATASE: CPT

## 2024-12-01 PROCEDURE — 36415 COLL VENOUS BLD VENIPUNCTURE: CPT

## 2024-12-01 PROCEDURE — 71045 X-RAY EXAM CHEST 1 VIEW: CPT | Performed by: RADIOLOGY

## 2024-12-01 PROCEDURE — 70450 CT HEAD/BRAIN W/O DYE: CPT | Performed by: STUDENT IN AN ORGANIZED HEALTH CARE EDUCATION/TRAINING PROGRAM

## 2024-12-01 PROCEDURE — 99285 EMERGENCY DEPT VISIT HI MDM: CPT | Mod: 25

## 2024-12-01 PROCEDURE — 87636 SARSCOV2 & INF A&B AMP PRB: CPT

## 2024-12-01 PROCEDURE — 83880 ASSAY OF NATRIURETIC PEPTIDE: CPT

## 2024-12-01 ASSESSMENT — COLUMBIA-SUICIDE SEVERITY RATING SCALE - C-SSRS
1. IN THE PAST MONTH, HAVE YOU WISHED YOU WERE DEAD OR WISHED YOU COULD GO TO SLEEP AND NOT WAKE UP?: NO
2. HAVE YOU ACTUALLY HAD ANY THOUGHTS OF KILLING YOURSELF?: NO
6. HAVE YOU EVER DONE ANYTHING, STARTED TO DO ANYTHING, OR PREPARED TO DO ANYTHING TO END YOUR LIFE?: NO

## 2024-12-01 ASSESSMENT — LIFESTYLE VARIABLES
HAVE PEOPLE ANNOYED YOU BY CRITICIZING YOUR DRINKING: NO
HAVE YOU EVER FELT YOU SHOULD CUT DOWN ON YOUR DRINKING: NO
TOTAL SCORE: 0
EVER HAD A DRINK FIRST THING IN THE MORNING TO STEADY YOUR NERVES TO GET RID OF A HANGOVER: NO
EVER FELT BAD OR GUILTY ABOUT YOUR DRINKING: NO

## 2024-12-01 ASSESSMENT — PAIN - FUNCTIONAL ASSESSMENT: PAIN_FUNCTIONAL_ASSESSMENT: 0-10

## 2024-12-01 ASSESSMENT — PAIN SCALES - GENERAL: PAINLEVEL_OUTOF10: 0 - NO PAIN

## 2024-12-01 NOTE — PROGRESS NOTES
The patient was seen by the midlevel/resident.  I have personally saw the patient and made/approved the management plan and take responsibility for the patient management.  I reviewed the EKG's (when done) and agree with the interpretation.  I have seen and examined the patient; agree with the workup, evaluation, MDM, and diagnosis.  The care plan has been discussed with the midlevel/resident; I have reviewed the note and agree with the documented findings.     Patient presents after a fall at home.  He says he did not hit his head.  He did complain of feeling little lightheaded.  He has a history of advanced Parkinson's and has had episodes like this in the past.  He denies any pain.  He denies any fever chills cough or cold.  We worked up in ED with CT scan and labs.  Patient stable. Patient is stable. No indication for hospitalization. Patient wants to go home.   ED Course as of 12/01/24 1521   Sun Dec 01, 2024   1315 EKG performed at 1307, interpreted by me.  Atrial paced rhythm with rate of 73.  Right bundle branch block.  No ST elevation or depression, no T wave abnormalities.  When compared to EKG from January 2022, EKG today is unchanged [PW]   1325 Spoke with Payam from [employer] who confirmed patient's report after interrogating his pacemaker.  Reports it has been in for 9 years, no abnormal activity, no recent defibrillation, patient's been an appropriate rhythm and confirms that there is about 5 months left of battery on the patient's pace pacemaker which wife confirms at bedside [PW]      ED Course User Index  [PW] Rossy Goodwin DO         Diagnoses as of 12/01/24 1521   Dizziness   Orthostatic hypotension     Ravindra Portillo MD

## 2024-12-01 NOTE — ED PROVIDER NOTES
Emergency Department Provider Note        History of Present Illness     History provided by: Patient and EMS  Limitations to History: Mental Illness and Dementia  External Records Reviewed with Brief Summary: I reviewed prior ED visits, Care Everywhere, discharge summaries and outpatient records as appropriate.     HPI:  Prem Michael is a 79 y.o. male past medical history significant for Parkinson's disease, pacemaker, neurogenic orthostatic hypotension presenting to the emergency department with dizziness.  Wife provides majority of collateral given patient's Parkinson disorder.  Wife states that patient woke up this morning and reported feeling dizzy when attempting to get out of bed.  Wife states she came into the room to check on him, noted patient to b be standing and reporting lightheadedness.  Reported he felt like he was going to fall so she slowly lowered him to the floor.  After a period of time she was able to get him up into a chair and then patient was able to ambulate back to bed.  Patient does not currently require a walker or a cane.  Patient was able to use the bathroom without significant difficulty and wife states that she rechecked his blood pressure after getting him back into bed his blood pressure was noted to be 80/50.  She denies any fever, chills, nausea, vomiting, diarrhea, abdominal pain.  Reports no head strike or loss of consciousness.  No headache or vision changes, no loss of vision.  No chest pain or shortness of breath.  Patient is followed closely outpatient by neurology, cardiology and receives physical therapy weekly.  No other acute complaints.    Physical Exam   Triage vitals:  T 36.6 °C (97.9 °F)  HR 72  BP (!) 154/113  RR 18  O2 100 % None (Room air)    General: Awake, alert, in no acute distress  Eyes: Gaze conjugate.  No scleral icterus or injection  HENT: Normo-cephalic, atraumatic. No stridor  CV: Regular rate, regular rhythm. Radial pulses 2+ bilaterally  Resp:  Breathing non-labored, speaking in full sentences.  Clear to auscultation bilaterally  GI: Soft, non-distended, non-tender. No rebound or guarding.  MSK/Extremities: No gross bony deformities. Moving all extremities.  No bilateral lower pitting edema.  Pelvis is stable to palpation, no reproducible tenderness to bilateral upper and lower extremities.  Skin: Warm. Appropriate color.  No evidence of skin tears, hematomas, ecchymosis or cellulitic changes  Neuro: Alert. Oriented. Face symmetric. Speech is fluent.  Gross strength and sensation intact in b/l UE and LEs  Psych: Appropriate mood and affect    Medical Decision Making & ED Course   Medical Decision Makin y.o. male presenting to the emergency department with dizziness and concerning for presyncope.  On physical exam, patient is hemodynamically stable and in no acute distress, overall well-appearing.  Given history, exam, workup, suspect his symptoms are likely in the setting of known orthostatic hypotension.  Low suspicion for heart failure, low concern for ICH as he has not had any trauma or headache, no history of seizures.  Head CT ordered and negative for evidence of acute stroke or intracranial bleed.  Interrogated patient's pacemaker and it is functioning appropriately.  Has equal pulses in the upper and lower extremities, low concern for aortic causes of dizziness or presyncope.  EKG shows paced rhythm which is at baseline compared to prior.  In the absence of any chest pain and shortness of breath and low concern for PE, he has no evidence of DVT or infection on exam.  Workup here grossly unremarkable with no acute findings.  Patient tested for COVID and influenza, both are negative.,  Troponin BMP within normal limits.  On reevaluation, patient and family updated with results.  With shared decision making with family, patient and family are comfortable with plan for outpatient follow-up.  Do not feel that further workup indicated at this time.  Discussed results, diagnosis/differential, and plan with patient. Patient advised to follow up with primary physician in 2-3 days. Discussed return precautions and encouraged patient to return to the Emergency Department for any concerning symptoms or worsening condition. Patient expresses understanding and is in agreement. All questions answered. Patient discharged in stable condition.  ----     Social Determinants of Health which Significantly Impact Care: None identified     EKG Independent Interpretation: EKG interpreted by myself. Please see ED Course for full interpretation.    Independent Result Review and Interpretation: Relevant laboratory and radiographic results were reviewed and independently interpreted by myself.  As necessary, they are commented on in the ED Course.    Chronic conditions affecting the patient's care: As documented above in [facility]    The patient was discussed with the following consultants/services: None    Care Considerations: As documented above in [facility]    ED Course:  ED Course as of 12/01/24 1510   Sun Dec 01, 2024   1315 EKG performed at 1307, interpreted by me.  Atrial paced rhythm with rate of 73.  Right bundle branch block.  No ST elevation or depression, no T wave abnormalities.  When compared to EKG from January 2022, EKG today is unchanged [PW]   1325 Spoke with Payam from [employer] who confirmed patient's report after interrogating his pacemaker.  Reports it has been in for 9 years, no abnormal activity, no recent defibrillation, patient's been an appropriate rhythm and confirms that there is about 5 months left of battery on the patient's pace pacemaker which wife confirms at bedside [PW]      ED Course User Index  [PW] Rossy Goodwin DO         Diagnoses as of 12/01/24 1510   Dizziness   Orthostatic hypotension     Disposition   As a result of the work-up, the patient was discharged home.  he was informed of his diagnosis and instructed to come back with any concerns or  worsening of condition.  he and was agreeable to the plan as discussed above.  he was given the opportunity to ask questions.  All of the patient's questions were answered.    Procedures   Procedures    Patient seen and discussed with ED attending physician.    Rossy Goodwin DO  Emergency Medicine       Rossy Goodwin DO  Resident  12/01/24 3205

## 2024-12-01 NOTE — ED TRIAGE NOTES
Pt arrived by EMS from home, family called and said that he had low blood pressure (90/52). EMS got a blood pressure of 122/81. Pt states he got dizzy and fell. Pt states no chest pain, OB or pain upon arrival. Pt does state feeling light headed.

## 2024-12-02 LAB
ATRIAL RATE: 73 BPM
P AXIS: 53 DEGREES
P OFFSET: 203 MS
P ONSET: 142 MS
PR INTERVAL: 196 MS
Q ONSET: 220 MS
QRS COUNT: 12 BEATS
QRS DURATION: 148 MS
QT INTERVAL: 456 MS
QTC CALCULATION(BAZETT): 502 MS
QTC FREDERICIA: 487 MS
R AXIS: -54 DEGREES
T AXIS: 11 DEGREES
T OFFSET: 448 MS
VENTRICULAR RATE: 73 BPM

## 2024-12-16 ENCOUNTER — TELEPHONE (OUTPATIENT)
Age: 79
End: 2024-12-16

## 2024-12-16 NOTE — TELEPHONE ENCOUNTER
Pt's wife called in stating pt's dyskinesia has progressed since stopping amantadine and asking if pt should start taking again.    Last Appt: 11/18/24  Next Appt: 03/05/25

## 2025-03-05 ENCOUNTER — TELEPHONE (OUTPATIENT)
Age: 80
End: 2025-03-05

## 2025-03-05 ENCOUNTER — TELEMEDICINE (OUTPATIENT)
Age: 80
End: 2025-03-05
Payer: MEDICARE

## 2025-03-05 DIAGNOSIS — G20.A2 MOTOR FLUCTUATIONS RELATED TO MEDICATION USE IN PARKINSON'S DISEASE (HCC): ICD-10-CM

## 2025-03-05 DIAGNOSIS — I95.1 ORTHOSTATIC HYPOTENSION: ICD-10-CM

## 2025-03-05 DIAGNOSIS — R47.01 APHASIA: ICD-10-CM

## 2025-03-05 DIAGNOSIS — T42.8X5A MOTOR FLUCTUATIONS RELATED TO MEDICATION USE IN PARKINSON'S DISEASE (HCC): ICD-10-CM

## 2025-03-05 DIAGNOSIS — G20.B2 PARKINSON'S DISEASE WITH DYSKINESIA AND FLUCTUATING MANIFESTATIONS (HCC): Primary | ICD-10-CM

## 2025-03-05 PROCEDURE — 99214 OFFICE O/P EST MOD 30 MIN: CPT | Performed by: PSYCHIATRY & NEUROLOGY

## 2025-03-05 PROCEDURE — 1123F ACP DISCUSS/DSCN MKR DOCD: CPT | Performed by: PSYCHIATRY & NEUROLOGY

## 2025-03-05 PROCEDURE — 1159F MED LIST DOCD IN RCRD: CPT | Performed by: PSYCHIATRY & NEUROLOGY

## 2025-03-05 RX ORDER — FLUDROCORTISONE ACETATE 0.1 MG/1
0.2 TABLET ORAL DAILY
Qty: 60 TABLET | Refills: 5 | Status: SHIPPED | OUTPATIENT
Start: 2025-03-05 | End: 2025-09-01

## 2025-03-05 RX ORDER — AMANTADINE HYDROCHLORIDE 100 MG/1
100 CAPSULE, GELATIN COATED ORAL DAILY
Qty: 30 CAPSULE | Refills: 5 | Status: SHIPPED | OUTPATIENT
Start: 2025-03-05

## 2025-03-05 RX ORDER — LEVODOPA AND CARBIDOPA 145; 36.25 MG/1; MG/1
3 CAPSULE, EXTENDED RELEASE ORAL 3 TIMES DAILY
Qty: 270 CAPSULE | Refills: 5 | Status: SHIPPED | OUTPATIENT
Start: 2025-03-05

## 2025-03-05 RX ORDER — MEMANTINE HYDROCHLORIDE 10 MG/1
10 TABLET ORAL 2 TIMES DAILY
Qty: 60 TABLET | Refills: 5 | Status: SHIPPED | OUTPATIENT
Start: 2025-03-05

## 2025-03-05 NOTE — PROGRESS NOTES
Philly Covington MD       Holland Leong is a 79 y.o. male presenting as a follow patient for a parkinsons disease    3 day off period.   Fell on Monday  Slowed responses.   Needed a walker   Was dizzy   164/82 mm bp  109/42 mm. -when he fell         Parkinsons disease   Date of Diagnosis: 2015  Since diagnosis in [place].     Onset of symtoms : since 2015    Progression: since dose increase:   Orthostatic hypotension better with d/c of metoprolol  -wearing compression stockings  -on florinef   -not taking droxidopa        2. Vomiting -resolved   D/bill of exelon patch      3. Increased dyskinesias                Present medication:   Treatment : was on sinemet 25/250 mg  1.5 pill tid- 8am, 12 noon and 4 pm. Wears off 30 minutes before next dose   Not taking comtan - made him extremely weak     D/bill Takes sinemet cr 50/200 mg at 8pm   On amantadine   Rytary 36/145 mg 3 tablets tid     Primary motor symptoms: Resting tremor, Bradykinesia (slow movement), Rigidity and Postural Instability (Impaired Balance and Coordination)  Secondary motor symptoms: Stooped posture, a tendency to lean forward, Fatigue, Impaired fine motor dexterity and motor coordination , Impaired gross motor coordination , Speech problems, such as softness of voice or slurred speech caused by lack of muscle control and Loss of facial expression, or \"masking\"   Non motor symptoms: Dementia or confusion and Depression      Now:   Dyskinesias: minimal   Some tongue movements     Motor fluctuations: wears off an hour before is due for medication  Notices it in pm - evening     Tremors: yes, right handed  Has resting tremors in right arm.   None in left arm/legs.   Able to bowl 3 games on friday      Stiffness: no longer feels tight on waking up   Gait/walking difficulties: better  Cane, walker, wheelchair use: no  Falls: 1 fall on monday  Mood/Behavior changes: yes, had a manic phase in summer 2021  Hallucinations: medication induced, from

## 2025-03-11 RX ORDER — MIRTAZAPINE 15 MG/1
15 TABLET, FILM COATED ORAL NIGHTLY
Qty: 30 TABLET | Refills: 5 | Status: SHIPPED | OUTPATIENT
Start: 2025-03-11

## 2025-04-04 ENCOUNTER — TELEPHONE (OUTPATIENT)
Age: 80
End: 2025-04-04

## 2025-04-04 RX ORDER — QUETIAPINE FUMARATE 25 MG/1
25 TABLET, FILM COATED ORAL
Qty: 30 TABLET | Refills: 3 | Status: SHIPPED | OUTPATIENT
Start: 2025-04-04

## 2025-04-04 NOTE — TELEPHONE ENCOUNTER
Pt. Wife called in today stating pt. Has been having very vivid dreams that when he wakes up he can't tell what's real or not.  Pt. Wife states he was on seroquel in the past and worked for him she is requesting pt. Be back on it

## 2025-04-04 NOTE — TELEPHONE ENCOUNTER
The following medication has been approved and sent to your pharmacy    Requested Prescriptions     Signed Prescriptions Disp Refills    QUEtiapine (SEROQUEL) 25 MG tablet 30 tablet 3     Sig: Take 1 tablet by mouth nightly     Authorizing Provider: LUCA ZALDIVAR

## 2025-04-07 ENCOUNTER — HOSPITAL ENCOUNTER (OUTPATIENT)
Dept: MRI IMAGING | Age: 80
Discharge: HOME | End: 2025-04-09

## 2025-04-07 ENCOUNTER — TELEPHONE (OUTPATIENT)
Age: 80
End: 2025-04-07

## 2025-04-07 DIAGNOSIS — R47.01 APHASIA: ICD-10-CM

## 2025-05-06 RX ORDER — MEMANTINE HYDROCHLORIDE 10 MG/1
10 TABLET ORAL 2 TIMES DAILY
Qty: 60 TABLET | Refills: 5 | OUTPATIENT
Start: 2025-05-06

## 2025-07-16 ENCOUNTER — HOSPITAL ENCOUNTER (OUTPATIENT)
Dept: MRI IMAGING | Age: 80
Discharge: HOME | End: 2025-07-18
Attending: PSYCHIATRY & NEUROLOGY
Payer: MEDICARE

## 2025-07-16 PROCEDURE — 70551 MRI BRAIN STEM W/O DYE: CPT

## 2025-07-21 ENCOUNTER — OFFICE VISIT (OUTPATIENT)
Age: 80
End: 2025-07-21
Payer: MEDICARE

## 2025-07-21 VITALS
BODY MASS INDEX: 21.83 KG/M2 | HEART RATE: 85 BPM | WEIGHT: 164.7 LBS | DIASTOLIC BLOOD PRESSURE: 58 MMHG | SYSTOLIC BLOOD PRESSURE: 97 MMHG | HEIGHT: 73 IN

## 2025-07-21 DIAGNOSIS — G20.B2 PARKINSON'S DISEASE WITH DYSKINESIA AND FLUCTUATING MANIFESTATIONS (HCC): Primary | ICD-10-CM

## 2025-07-21 DIAGNOSIS — T42.8X5A MOTOR FLUCTUATIONS RELATED TO MEDICATION USE IN PARKINSON'S DISEASE (HCC): ICD-10-CM

## 2025-07-21 DIAGNOSIS — G20.A2 MOTOR FLUCTUATIONS RELATED TO MEDICATION USE IN PARKINSON'S DISEASE (HCC): ICD-10-CM

## 2025-07-21 DIAGNOSIS — G20.A1 MILD DEMENTIA DUE TO PARKINSON'S DISEASE, WITH AGITATION (HCC): ICD-10-CM

## 2025-07-21 DIAGNOSIS — F02.A11 MILD DEMENTIA DUE TO PARKINSON'S DISEASE, WITH AGITATION (HCC): ICD-10-CM

## 2025-07-21 DIAGNOSIS — I95.1 ORTHOSTATIC HYPOTENSION: ICD-10-CM

## 2025-07-21 PROCEDURE — 1159F MED LIST DOCD IN RCRD: CPT | Performed by: PSYCHIATRY & NEUROLOGY

## 2025-07-21 PROCEDURE — 1123F ACP DISCUSS/DSCN MKR DOCD: CPT | Performed by: PSYCHIATRY & NEUROLOGY

## 2025-07-21 PROCEDURE — 99214 OFFICE O/P EST MOD 30 MIN: CPT | Performed by: PSYCHIATRY & NEUROLOGY

## 2025-07-21 RX ORDER — POLYETHYLENE GLYCOL 3350 17 G/17G
POWDER, FOR SOLUTION ORAL EVERY MORNING
COMMUNITY

## 2025-07-21 RX ORDER — LEVODOPA AND CARBIDOPA 145; 36.25 MG/1; MG/1
3 CAPSULE, EXTENDED RELEASE ORAL 3 TIMES DAILY
Qty: 270 CAPSULE | Refills: 5 | Status: SHIPPED | OUTPATIENT
Start: 2025-07-21

## 2025-07-21 RX ORDER — QUETIAPINE FUMARATE 25 MG/1
25 TABLET, FILM COATED ORAL
Qty: 30 TABLET | Refills: 5 | Status: SHIPPED | OUTPATIENT
Start: 2025-07-21

## 2025-07-21 RX ORDER — MIRTAZAPINE 15 MG/1
15 TABLET, ORALLY DISINTEGRATING ORAL NIGHTLY
Qty: 30 TABLET | Refills: 5 | Status: SHIPPED | OUTPATIENT
Start: 2025-07-21

## 2025-07-21 RX ORDER — FLUDROCORTISONE ACETATE 0.1 MG/1
0.2 TABLET ORAL DAILY
Qty: 60 TABLET | Refills: 5 | Status: SHIPPED | OUTPATIENT
Start: 2025-07-21 | End: 2026-01-17

## 2025-07-21 RX ORDER — AMANTADINE HYDROCHLORIDE 50 MG/5ML
100 SOLUTION ORAL DAILY
Qty: 300 ML | Refills: 5 | Status: SHIPPED | OUTPATIENT
Start: 2025-07-21

## 2025-07-21 RX ORDER — MEMANTINE HYDROCHLORIDE 2 MG/ML
10 SOLUTION ORAL 2 TIMES DAILY
Qty: 600 ML | Refills: 5 | Status: SHIPPED | OUTPATIENT
Start: 2025-07-21

## 2025-07-21 ASSESSMENT — MINI MENTAL STATE EXAM
WHAT YEAR IS THIS?: 1
WHAT IS TODAY'S DATE?: 0
SAY: PUT THE PAPER DOWN ON THE FLOOR, SCORE IF PAPER IS PLACED BACK ON FLOOR: 0
SAY: I WOULD LIKE YOU TO REPEAT THIS PHRASE AFTER ME: NO IFS, ANDS, OR BUTS.: 1
WHAT FLOOR ARE WE ON [IN FACILITY]?/ WHAT ROOM ARE WE IN [IN HOME]?: 1
SHOW: WRISTWATCH [OBJECT] ASK: WHAT IS THIS CALLED?: 0
SAY: FOLD THE PAPER IN HALF ONCE WITH BOTH HANDS, SCORE IF PAPER IS CORRECTLY FOLDED IN HALF.: 0
WHICH SEASON IS THIS?: 1
WHAT MONTH IS THIS?: 1
WHAT CITY/TOWN ARE WE IN?: 1
PLACE DESIGN, ERASER AND PENCIL IN FRONT OF THE PERSON.  SAY:  COPY THIS DESIGN PLEASE.  SHOW: DESIGN. ALLOW: MULTIPLE TRIES. WAIT UNTIL PERSON IS FINISHED AND HANDS IT BACK. SCORE: ONLY FOR DIAGRAM WITH 4-SIDED FIGURE BETWEEN TWO 5-SIDED FIGURES: 0
NOW WHAT WERE THE THREE OBJECTS I ASKED YOU TO REMEMBER?: 2
WHAT IS THE NAME OF THIS BUILDING [IN FACILITY]?/WHAT IS THE STREET ADDRESS OF THIS HOUSE [IN HOME]?: 0
WHAT DAY OF THE WEEK IS THIS?: 0
HAND THE PERSON A PENCIL AND PAPER. SAY: WRITE ANY COMPLETE SENTENCE ON THAT
PIECE OF PAPER. (NOTE: THE SENTENCE MUST MAKE SENSE. IGNORE SPELLING ERRORS): 0
SUM ALL MMSE QUESTIONS FOR TOTAL SCORE [OUT OF 30].: 15
SAY: READ THE WORDS ON THE PAGE AND THEN DO WHAT IT SAYS. THEN HAND THE PERSON
THE SHEET WITH CLOSE YOUR EYES ON IT. IF THE SUBJECT READS AND DOES NOT CLOSE THEIR EYES, REPEAT UP TO THREE TIMES. SCORE ONLY IF SUBJECT CLOSES EYES.: 1
WHAT COUNTRY ARE WE IN?: 1
SHOW: PENCIL [OBJECT] ASK: WHAT IS THIS CALLED?: 1
SAY: I AM GOING TO NAME THREE OBJECTS. WHEN I AM FINISHED, I WANT YOU TO REPEAT
THEM. REMEMBER WHAT THEY ARE BECAUSE I AM GOING TO ASK YOU TO NAME THEM AGAIN IN
A FEW MINUTES.  SAY THE FOLLOWING WORDS SLOWLY AT 1-SECOND INTERVALS - BALL/ CAR/ MAN [ITERATIONS FOR REPEAT ADMINISTRATION]: 2
ASK THE PERSON IF HE IS RIGHT OR LEFT-HANDED. TAKE A PIECE OF PAPER AND HOLD IT UP IN
FRONT OF THE PERSON. SAY: TAKE THIS PAPER IN YOUR RIGHT/LEFT HAND (WHICHEVER IS NON-
DOMINANT), SCORE IF PAPER IS PICKED UP IN CORRECT HAND.: 0
WHAT STATE [OR PROVINCE] ARE WE IN?: 1
SAY: I WOULD LIKE YOU TO COUNT BACKWARD FROM 100 BY SEVENS: 1

## 2025-07-21 NOTE — PROGRESS NOTES
100 mg q daily   Remeron 15 mg qhs    namenda 5 mg bid   Seroquel 25 mg qhs           Past Medical History:   Diagnosis Date    Cancer (HCC) 2014    skin    Hearing loss     Hypertension     Memory disorder     PD progression    Movement disorder 2015    PD    Tremor      Past Surgical History:   Procedure Laterality Date    BACK SURGERY       Social History     Socioeconomic History    Marital status: Unknown     Spouse name: None    Number of children: None    Years of education: None    Highest education level: None   Tobacco Use    Smoking status: Former     Current packs/day: 0.00     Average packs/day: 1.6 packs/day for 37.5 years (60.0 ttl pk-yrs)     Types: Cigarettes     Start date: 1953     Quit date:      Years since quittin.5    Smokeless tobacco: Never   Vaping Use    Vaping status: Never Used   Substance and Sexual Activity    Alcohol use: Yes     Alcohol/week: 10.0 standard drinks of alcohol     Types: 7 Cans of beer, 3 Drinks containing 0.5 oz of alcohol per week    Drug use: Never    Sexual activity: Yes     Partners: Female     Comment: with spouse     Social Drivers of Health     Financial Resource Strain: Low Risk  (3/6/2023)    [facility]    Overall Financial Resource Strain (CARDIA)     Difficulty of Paying Living Expenses: Not very hard   Food Insecurity: No Food Insecurity (2025)    Received from [facility]    Hunger Vital Sign     Worried About Running Out of Food in the Last Year: Never true     Ran Out of Food in the Last Year: Never true   Transportation Needs: No Transportation Needs (2025)    Received from [facility]    PRAPARE - Transportation     Lack of Transportation (Medical): No     Lack of Transportation (Non-Medical): No   Housing Stability: Unknown (2025)    Received from [facility]    Housing Stability Vital Sign     Unable to Pay for Housing in the Last Year: No     Homeless in the